# Patient Record
Sex: MALE | URBAN - NONMETROPOLITAN AREA
[De-identification: names, ages, dates, MRNs, and addresses within clinical notes are randomized per-mention and may not be internally consistent; named-entity substitution may affect disease eponyms.]

---

## 2023-10-04 ENCOUNTER — TELEPHONE (OUTPATIENT)
Dept: CARDIOLOGY CLINIC | Age: 53
End: 2023-10-04

## 2023-10-04 RX ORDER — FLECAINIDE ACETATE 100 MG/1
100 TABLET ORAL 2 TIMES DAILY
COMMUNITY

## 2023-10-04 RX ORDER — DILTIAZEM HYDROCHLORIDE 120 MG/1
120 CAPSULE, COATED, EXTENDED RELEASE ORAL DAILY
COMMUNITY

## 2023-11-22 ENCOUNTER — OFFICE VISIT (OUTPATIENT)
Dept: CARDIOLOGY CLINIC | Age: 53
End: 2023-11-22
Payer: MEDICARE

## 2023-11-22 VITALS
SYSTOLIC BLOOD PRESSURE: 144 MMHG | WEIGHT: 242 LBS | DIASTOLIC BLOOD PRESSURE: 87 MMHG | HEART RATE: 91 BPM | BODY MASS INDEX: 34.65 KG/M2 | HEIGHT: 70 IN

## 2023-11-22 DIAGNOSIS — I48.91 ATRIAL FIBRILLATION, UNSPECIFIED TYPE (HCC): Primary | ICD-10-CM

## 2023-11-22 PROCEDURE — 93000 ELECTROCARDIOGRAM COMPLETE: CPT | Performed by: INTERNAL MEDICINE

## 2023-11-22 PROCEDURE — 99204 OFFICE O/P NEW MOD 45 MIN: CPT | Performed by: INTERNAL MEDICINE

## 2023-11-22 NOTE — PATIENT INSTRUCTIONS
Our office will be calling you to schedule your procedure. Procedures are booking out into January.   Office call back # [phone] opt 1

## 2023-11-22 NOTE — PROGRESS NOTES
[place] [address] [facility] [address] [facility]  Dept: [phone]  Cardiac Electrophysiology: Consultation Note  Patient's demographics:  Date:   11/22/2023  Patient name:              Pat Wood  YOB: 1970  Sex:    male   MRN:   [id number]    Primary Care Physician:  J Carlos Bangura DO    Cardiologist:  Sam Richter MD    Reason for Consultation:  Atrial fibrillation, evaluation for catheter ablation. Clinical Summary:  Continue is a 46years old gentleman started to have intermittent palpitations 5 months ago, transient and self terminating. He remained well did offer until August 2020 23 bpm.  Milligram symptoms. He was seen by Dr. Cody Vargas and noted to be atrial fibrillation. He underwent cardioversion and had recurrences within 10 minutes. He was started on flecainide, metoprolol and Cardizem for rate control and a repeat cardioversion on 9/20/2023 that lasted for few minutes. Was referred here for evaluation of catheter ablation. Patient agreed last for few minutes but can go to an hour. Denies chest pain, shortness of breath, syncope or lower extremity swelling. No prior history of atrial arrhythmia. He is a ,  with kids. Denies smoking drinks socially. Father had MI at age 48 years. Medical Hx: Paroxysmal atrial fibrillation dx 8/2023 => DCCV 8/2023 and 9/2023 with recurrences on flecainide, metoprolol, diltiazem and rivaroxaban, hypertension and obesity. Review of systems:  Constitutional: Negative for chills and fever  HENT: Negative for congestion, sinus pressure, sneezing and sore throat. Eyes: Negative for pain, discharge, redness and itching. Respiratory: Negative for apnea, cough  Gastrointestinal: Negative for blood in stool, constipation, diarrhea   Endocrine: Negative for cold intolerance, heat intolerance, polydipsia.   Genitourinary: Negative for dysuria, enuresis, flank

## 2023-11-28 ENCOUNTER — TELEPHONE (OUTPATIENT)
Dept: CARDIOLOGY CLINIC | Age: 53
End: 2023-11-28

## 2023-11-28 DIAGNOSIS — I48.3 TYPICAL ATRIAL FLUTTER (HCC): ICD-10-CM

## 2023-11-28 DIAGNOSIS — I48.0 PAROXYSMAL ATRIAL FIBRILLATION (HCC): ICD-10-CM

## 2023-11-28 DIAGNOSIS — I48.91 ATRIAL FIBRILLATION, UNSPECIFIED TYPE (HCC): Primary | ICD-10-CM

## 2023-11-28 NOTE — TELEPHONE ENCOUNTER
Procedure: Afib. Aflutter ablation   Date:   Arrival Time:   Meds to Hold: Xarelto 1 day prior,  cardiazem 3 days prior, metoprolol 3 days prior,  flecainide 3 days prior. Please see medications -  do you agree?         Call went directly to CHELSIE

## 2023-12-01 PROBLEM — I48.3 TYPICAL ATRIAL FLUTTER (HCC): Status: ACTIVE | Noted: 2023-11-28

## 2023-12-01 PROBLEM — I48.0 PAROXYSMAL ATRIAL FIBRILLATION (HCC): Status: ACTIVE | Noted: 2023-11-28

## 2023-12-07 ENCOUNTER — TELEPHONE (OUTPATIENT)
Dept: CARDIOLOGY CLINIC | Age: 53
End: 2023-12-07

## 2023-12-07 NOTE — TELEPHONE ENCOUNTER
La Nena Barahona is requesting office notes from 11/22/23 be faxed to Dr. Teresa Maddox office [phone]

## 2024-01-03 ENCOUNTER — PATIENT MESSAGE (OUTPATIENT)
Dept: CARDIOLOGY CLINIC | Age: 54
End: 2024-01-03

## 2024-01-03 NOTE — PROGRESS NOTES
NPO after midnight  Bring drivers license and insurance information  Wear comfortable clean clothes  Shower morning of and night before with liquid antibacterial soap  Remove jewelry   Bring medications in original bottles  Made aware of visitors limit to 2 at a time  Follow all instructions given by your physician  Please notify doctor office if you need to cancel or reschedule your procedure   needed at discharge     Left message

## 2024-01-08 ENCOUNTER — PREP FOR PROCEDURE (OUTPATIENT)
Dept: CARDIOLOGY | Age: 54
End: 2024-01-08

## 2024-01-08 RX ORDER — SODIUM CHLORIDE 9 MG/ML
INJECTION, SOLUTION INTRAVENOUS PRN
Status: CANCELLED | OUTPATIENT
Start: 2024-01-08

## 2024-01-08 RX ORDER — SODIUM CHLORIDE 0.9 % (FLUSH) 0.9 %
5-40 SYRINGE (ML) INJECTION EVERY 12 HOURS SCHEDULED
Status: CANCELLED | OUTPATIENT
Start: 2024-01-08

## 2024-01-08 RX ORDER — SODIUM CHLORIDE 0.9 % (FLUSH) 0.9 %
5-40 SYRINGE (ML) INJECTION PRN
Status: CANCELLED | OUTPATIENT
Start: 2024-01-08

## 2024-01-09 ENCOUNTER — HOSPITAL ENCOUNTER (OUTPATIENT)
Age: 54
Setting detail: OUTPATIENT SURGERY
Discharge: HOME HEALTH | End: 2024-01-09
Attending: INTERNAL MEDICINE | Admitting: INTERNAL MEDICINE
Payer: COMMERCIAL

## 2024-01-09 ENCOUNTER — ANESTHESIA (OUTPATIENT)
Age: 54
End: 2024-01-09
Payer: COMMERCIAL

## 2024-01-09 ENCOUNTER — ANESTHESIA EVENT (OUTPATIENT)
Age: 54
End: 2024-01-09
Payer: COMMERCIAL

## 2024-01-09 VITALS
HEIGHT: 70 IN | SYSTOLIC BLOOD PRESSURE: 130 MMHG | BODY MASS INDEX: 34.72 KG/M2 | OXYGEN SATURATION: 97 % | HEART RATE: 81 BPM | TEMPERATURE: 97.6 F | WEIGHT: 242.51 LBS | RESPIRATION RATE: 16 BRPM | DIASTOLIC BLOOD PRESSURE: 70 MMHG

## 2024-01-09 DIAGNOSIS — I48.3 TYPICAL ATRIAL FLUTTER (HCC): ICD-10-CM

## 2024-01-09 DIAGNOSIS — I48.0 PAROXYSMAL ATRIAL FIBRILLATION (HCC): ICD-10-CM

## 2024-01-09 LAB
ABO: NORMAL
ACTIVATED CLOTTING TIME: 158 SECONDS (ref 1–150)
ACTIVATED CLOTTING TIME: 277 SECONDS (ref 1–150)
ACTIVATED CLOTTING TIME: 288 SECONDS (ref 1–150)
ACTIVATED CLOTTING TIME: 336 SECONDS (ref 1–150)
ACTIVATED CLOTTING TIME: 347 SECONDS (ref 1–150)
ACTIVATED CLOTTING TIME: 374 SECONDS (ref 1–150)
ANION GAP SERPL CALC-SCNC: 8 MEQ/L (ref 8–16)
ANTIBODY SCREEN: NORMAL
APTT PPP: 39.5 SECONDS (ref 22–38)
BUN SERPL-MCNC: 17 MG/DL (ref 7–22)
CALCIUM SERPL-MCNC: 9.3 MG/DL (ref 8.5–10.5)
CHLORIDE SERPL-SCNC: 103 MEQ/L (ref 98–111)
CO2 SERPL-SCNC: 27 MEQ/L (ref 23–33)
CREAT SERPL-MCNC: 1 MG/DL (ref 0.4–1.2)
DEPRECATED RDW RBC AUTO: 41.4 FL (ref 35–45)
ECHO BSA: 2.33 M2
ERYTHROCYTE [DISTWIDTH] IN BLOOD BY AUTOMATED COUNT: 12.7 % (ref 11.5–14.5)
GFR SERPL CREATININE-BSD FRML MDRD: > 60 ML/MIN/1.73M2
GLUCOSE SERPL-MCNC: 102 MG/DL (ref 70–108)
HCT VFR BLD AUTO: 48.7 % (ref 42–52)
HGB BLD-MCNC: 16.2 GM/DL (ref 14–18)
INR PPP: 1.15 (ref 0.85–1.13)
MCH RBC QN AUTO: 29.5 PG (ref 26–33)
MCHC RBC AUTO-ENTMCNC: 33.3 GM/DL (ref 32.2–35.5)
MCV RBC AUTO: 88.7 FL (ref 80–94)
PLATELET # BLD AUTO: 223 THOU/MM3 (ref 130–400)
PMV BLD AUTO: 10.8 FL (ref 9.4–12.4)
POTASSIUM SERPL-SCNC: 4.4 MEQ/L (ref 3.5–5.2)
RBC # BLD AUTO: 5.49 MILL/MM3 (ref 4.7–6.1)
RH FACTOR: NORMAL
SODIUM SERPL-SCNC: 138 MEQ/L (ref 135–145)
WBC # BLD AUTO: 8.1 THOU/MM3 (ref 4.8–10.8)

## 2024-01-09 PROCEDURE — C1766 INTRO/SHEATH,STRBLE,NON-PEEL: HCPCS | Performed by: INTERNAL MEDICINE

## 2024-01-09 PROCEDURE — 80048 BASIC METABOLIC PNL TOTAL CA: CPT

## 2024-01-09 PROCEDURE — 3700000000 HC ANESTHESIA ATTENDED CARE: Performed by: INTERNAL MEDICINE

## 2024-01-09 PROCEDURE — 85610 PROTHROMBIN TIME: CPT

## 2024-01-09 PROCEDURE — 2500000003 HC RX 250 WO HCPCS: Performed by: INTERNAL MEDICINE

## 2024-01-09 PROCEDURE — 2709999900 HC NON-CHARGEABLE SUPPLY: Performed by: INTERNAL MEDICINE

## 2024-01-09 PROCEDURE — 2580000003 HC RX 258: Performed by: STUDENT IN AN ORGANIZED HEALTH CARE EDUCATION/TRAINING PROGRAM

## 2024-01-09 PROCEDURE — C1730 CATH, EP, 19 OR FEW ELECT: HCPCS | Performed by: INTERNAL MEDICINE

## 2024-01-09 PROCEDURE — 6360000002 HC RX W HCPCS: Performed by: NURSE ANESTHETIST, CERTIFIED REGISTERED

## 2024-01-09 PROCEDURE — 2580000003 HC RX 258: Performed by: NURSE ANESTHETIST, CERTIFIED REGISTERED

## 2024-01-09 PROCEDURE — 93656 COMPRE EP EVAL ABLTJ ATR FIB: CPT | Performed by: INTERNAL MEDICINE

## 2024-01-09 PROCEDURE — 86850 RBC ANTIBODY SCREEN: CPT

## 2024-01-09 PROCEDURE — 85730 THROMBOPLASTIN TIME PARTIAL: CPT

## 2024-01-09 PROCEDURE — 93653 COMPRE EP EVAL TX SVT: CPT | Performed by: INTERNAL MEDICINE

## 2024-01-09 PROCEDURE — 92960 CARDIOVERSION ELECTRIC EXT: CPT | Performed by: INTERNAL MEDICINE

## 2024-01-09 PROCEDURE — 2500000003 HC RX 250 WO HCPCS: Performed by: NURSE ANESTHETIST, CERTIFIED REGISTERED

## 2024-01-09 PROCEDURE — 36415 COLL VENOUS BLD VENIPUNCTURE: CPT

## 2024-01-09 PROCEDURE — 85027 COMPLETE CBC AUTOMATED: CPT

## 2024-01-09 PROCEDURE — 6360000002 HC RX W HCPCS

## 2024-01-09 PROCEDURE — 86901 BLOOD TYPING SEROLOGIC RH(D): CPT

## 2024-01-09 PROCEDURE — C1760 CLOSURE DEV, VASC: HCPCS | Performed by: INTERNAL MEDICINE

## 2024-01-09 PROCEDURE — 93010 ELECTROCARDIOGRAM REPORT: CPT | Performed by: INTERNAL MEDICINE

## 2024-01-09 PROCEDURE — 2720000010 HC SURG SUPPLY STERILE: Performed by: INTERNAL MEDICINE

## 2024-01-09 PROCEDURE — 7100000000 HC PACU RECOVERY - FIRST 15 MIN: Performed by: INTERNAL MEDICINE

## 2024-01-09 PROCEDURE — 3700000001 HC ADD 15 MINUTES (ANESTHESIA): Performed by: INTERNAL MEDICINE

## 2024-01-09 PROCEDURE — C1893 INTRO/SHEATH, FIXED,NON-PEEL: HCPCS | Performed by: INTERNAL MEDICINE

## 2024-01-09 PROCEDURE — C1732 CATH, EP, DIAG/ABL, 3D/VECT: HCPCS | Performed by: INTERNAL MEDICINE

## 2024-01-09 PROCEDURE — 85347 COAGULATION TIME ACTIVATED: CPT

## 2024-01-09 PROCEDURE — 86900 BLOOD TYPING SEROLOGIC ABO: CPT

## 2024-01-09 PROCEDURE — 93005 ELECTROCARDIOGRAM TRACING: CPT | Performed by: STUDENT IN AN ORGANIZED HEALTH CARE EDUCATION/TRAINING PROGRAM

## 2024-01-09 PROCEDURE — 7100000001 HC PACU RECOVERY - ADDTL 15 MIN: Performed by: INTERNAL MEDICINE

## 2024-01-09 PROCEDURE — C1894 INTRO/SHEATH, NON-LASER: HCPCS | Performed by: INTERNAL MEDICINE

## 2024-01-09 PROCEDURE — C1759 CATH, INTRA ECHOCARDIOGRAPHY: HCPCS | Performed by: INTERNAL MEDICINE

## 2024-01-09 RX ORDER — ROCURONIUM BROMIDE 10 MG/ML
INJECTION, SOLUTION INTRAVENOUS PRN
Status: DISCONTINUED | OUTPATIENT
Start: 2024-01-09 | End: 2024-01-09 | Stop reason: SDUPTHER

## 2024-01-09 RX ORDER — SUCCINYLCHOLINE CHLORIDE 20 MG/ML
INJECTION INTRAMUSCULAR; INTRAVENOUS PRN
Status: DISCONTINUED | OUTPATIENT
Start: 2024-01-09 | End: 2024-01-09 | Stop reason: SDUPTHER

## 2024-01-09 RX ORDER — FUROSEMIDE 10 MG/ML
INJECTION INTRAMUSCULAR; INTRAVENOUS PRN
Status: DISCONTINUED | OUTPATIENT
Start: 2024-01-09 | End: 2024-01-09 | Stop reason: SDUPTHER

## 2024-01-09 RX ORDER — PANTOPRAZOLE SODIUM 40 MG/1
40 TABLET, DELAYED RELEASE ORAL
Qty: 30 TABLET | Refills: 0 | Status: SHIPPED | OUTPATIENT
Start: 2024-01-09

## 2024-01-09 RX ORDER — PROTAMINE SULFATE 10 MG/ML
INJECTION, SOLUTION INTRAVENOUS PRN
Status: DISCONTINUED | OUTPATIENT
Start: 2024-01-09 | End: 2024-01-09 | Stop reason: SDUPTHER

## 2024-01-09 RX ORDER — LIDOCAINE HYDROCHLORIDE 20 MG/ML
INJECTION, SOLUTION INTRAVENOUS PRN
Status: DISCONTINUED | OUTPATIENT
Start: 2024-01-09 | End: 2024-01-09 | Stop reason: SDUPTHER

## 2024-01-09 RX ORDER — PHENYLEPHRINE HCL IN 0.9% NACL 1 MG/10 ML
SYRINGE (ML) INTRAVENOUS PRN
Status: DISCONTINUED | OUTPATIENT
Start: 2024-01-09 | End: 2024-01-09 | Stop reason: SDUPTHER

## 2024-01-09 RX ORDER — MIDAZOLAM HYDROCHLORIDE 1 MG/ML
INJECTION INTRAMUSCULAR; INTRAVENOUS PRN
Status: DISCONTINUED | OUTPATIENT
Start: 2024-01-09 | End: 2024-01-09 | Stop reason: SDUPTHER

## 2024-01-09 RX ORDER — SODIUM CHLORIDE 9 MG/ML
INJECTION, SOLUTION INTRAVENOUS PRN
Status: DISCONTINUED | OUTPATIENT
Start: 2024-01-09 | End: 2024-01-09 | Stop reason: HOSPADM

## 2024-01-09 RX ORDER — PROPOFOL 10 MG/ML
INJECTION, EMULSION INTRAVENOUS PRN
Status: DISCONTINUED | OUTPATIENT
Start: 2024-01-09 | End: 2024-01-09 | Stop reason: SDUPTHER

## 2024-01-09 RX ORDER — FENTANYL CITRATE 50 UG/ML
INJECTION, SOLUTION INTRAMUSCULAR; INTRAVENOUS PRN
Status: DISCONTINUED | OUTPATIENT
Start: 2024-01-09 | End: 2024-01-09 | Stop reason: SDUPTHER

## 2024-01-09 RX ORDER — SODIUM CHLORIDE 9 MG/ML
INJECTION, SOLUTION INTRAVENOUS CONTINUOUS PRN
Status: DISCONTINUED | OUTPATIENT
Start: 2024-01-09 | End: 2024-01-09 | Stop reason: SDUPTHER

## 2024-01-09 RX ORDER — VASOPRESSIN 20 U/ML
INJECTION PARENTERAL PRN
Status: DISCONTINUED | OUTPATIENT
Start: 2024-01-09 | End: 2024-01-09 | Stop reason: SDUPTHER

## 2024-01-09 RX ORDER — DROPERIDOL 2.5 MG/ML
0.62 INJECTION, SOLUTION INTRAMUSCULAR; INTRAVENOUS ONCE
Status: COMPLETED | OUTPATIENT
Start: 2024-01-09 | End: 2024-01-09

## 2024-01-09 RX ORDER — EPHEDRINE SULFATE/0.9% NACL/PF 50 MG/5 ML
SYRINGE (ML) INTRAVENOUS PRN
Status: DISCONTINUED | OUTPATIENT
Start: 2024-01-09 | End: 2024-01-09 | Stop reason: SDUPTHER

## 2024-01-09 RX ORDER — DROPERIDOL 2.5 MG/ML
INJECTION, SOLUTION INTRAMUSCULAR; INTRAVENOUS
Status: COMPLETED
Start: 2024-01-09 | End: 2024-01-09

## 2024-01-09 RX ORDER — SODIUM CHLORIDE 0.9 % (FLUSH) 0.9 %
5-40 SYRINGE (ML) INJECTION EVERY 12 HOURS SCHEDULED
Status: DISCONTINUED | OUTPATIENT
Start: 2024-01-09 | End: 2024-01-09 | Stop reason: HOSPADM

## 2024-01-09 RX ORDER — SODIUM CHLORIDE 0.9 % (FLUSH) 0.9 %
5-40 SYRINGE (ML) INJECTION PRN
Status: DISCONTINUED | OUTPATIENT
Start: 2024-01-09 | End: 2024-01-09 | Stop reason: HOSPADM

## 2024-01-09 RX ORDER — HEPARIN SODIUM 1000 [USP'U]/ML
INJECTION, SOLUTION INTRAVENOUS; SUBCUTANEOUS PRN
Status: DISCONTINUED | OUTPATIENT
Start: 2024-01-09 | End: 2024-01-09 | Stop reason: SDUPTHER

## 2024-01-09 RX ADMIN — Medication 100 MCG: at 08:21

## 2024-01-09 RX ADMIN — FUROSEMIDE 20 MG: 10 INJECTION, SOLUTION INTRAMUSCULAR; INTRAVENOUS at 10:39

## 2024-01-09 RX ADMIN — HEPARIN SODIUM 5000 UNITS: 1000 INJECTION INTRAVENOUS; SUBCUTANEOUS at 08:15

## 2024-01-09 RX ADMIN — SODIUM CHLORIDE: 9 INJECTION, SOLUTION INTRAVENOUS at 07:19

## 2024-01-09 RX ADMIN — Medication 200 MCG: at 08:10

## 2024-01-09 RX ADMIN — SODIUM CHLORIDE: 9 INJECTION, SOLUTION INTRAVENOUS at 06:05

## 2024-01-09 RX ADMIN — Medication 100 MCG: at 07:49

## 2024-01-09 RX ADMIN — HEPARIN SODIUM 2000 UNITS: 1000 INJECTION INTRAVENOUS; SUBCUTANEOUS at 09:39

## 2024-01-09 RX ADMIN — PROPOFOL 200 MG: 10 INJECTION, EMULSION INTRAVENOUS at 07:32

## 2024-01-09 RX ADMIN — Medication 140 MG: at 07:32

## 2024-01-09 RX ADMIN — HEPARIN SODIUM 4000 UNITS: 1000 INJECTION INTRAVENOUS; SUBCUTANEOUS at 10:07

## 2024-01-09 RX ADMIN — DROPERIDOL 0.62 MG: 2.5 INJECTION, SOLUTION INTRAMUSCULAR; INTRAVENOUS at 11:37

## 2024-01-09 RX ADMIN — Medication 100 MCG: at 07:55

## 2024-01-09 RX ADMIN — Medication 100 MCG: at 08:36

## 2024-01-09 RX ADMIN — FENTANYL CITRATE 25 MCG: 50 INJECTION, SOLUTION INTRAMUSCULAR; INTRAVENOUS at 08:45

## 2024-01-09 RX ADMIN — LIDOCAINE HYDROCHLORIDE 100 MG: 20 INJECTION, SOLUTION INTRAVENOUS at 07:32

## 2024-01-09 RX ADMIN — HEPARIN SODIUM 10000 UNITS: 1000 INJECTION INTRAVENOUS; SUBCUTANEOUS at 08:31

## 2024-01-09 RX ADMIN — VASOPRESSIN 2 UNITS: 20 INJECTION INTRAVENOUS at 09:21

## 2024-01-09 RX ADMIN — Medication 100 MCG: at 07:39

## 2024-01-09 RX ADMIN — ROCURONIUM BROMIDE 5 MG: 10 INJECTION INTRAVENOUS at 07:32

## 2024-01-09 RX ADMIN — HEPARIN SODIUM 5000 UNITS: 1000 INJECTION INTRAVENOUS; SUBCUTANEOUS at 09:21

## 2024-01-09 RX ADMIN — FENTANYL CITRATE 25 MCG: 50 INJECTION, SOLUTION INTRAMUSCULAR; INTRAVENOUS at 08:21

## 2024-01-09 RX ADMIN — HEPARIN SODIUM 5000 UNITS: 1000 INJECTION INTRAVENOUS; SUBCUTANEOUS at 08:50

## 2024-01-09 RX ADMIN — VASOPRESSIN 2 UNITS: 20 INJECTION INTRAVENOUS at 10:28

## 2024-01-09 RX ADMIN — FENTANYL CITRATE 25 MCG: 50 INJECTION, SOLUTION INTRAMUSCULAR; INTRAVENOUS at 10:03

## 2024-01-09 RX ADMIN — Medication 5 MG: at 08:10

## 2024-01-09 RX ADMIN — VASOPRESSIN 2 UNITS: 20 INJECTION INTRAVENOUS at 08:34

## 2024-01-09 RX ADMIN — FENTANYL CITRATE 25 MCG: 50 INJECTION, SOLUTION INTRAMUSCULAR; INTRAVENOUS at 08:47

## 2024-01-09 RX ADMIN — PROTAMINE SULFATE 100 MG: 10 INJECTION, SOLUTION INTRAVENOUS at 10:40

## 2024-01-09 RX ADMIN — MIDAZOLAM 2 MG: 1 INJECTION INTRAMUSCULAR; INTRAVENOUS at 07:24

## 2024-01-09 ASSESSMENT — PAIN - FUNCTIONAL ASSESSMENT: PAIN_FUNCTIONAL_ASSESSMENT: NONE - DENIES PAIN

## 2024-01-09 NOTE — PROCEDURES
[facility]  HEART CENTER (EP)  [address] [facility] [address]  Dept: [phone]  CARDIAC ELECTROPHYSIOLOGY: PROCEDURE NOTE  Patients Demographics:  Date:   1/9/2024  Patient name:              Teodoro Prabhakar  YOB: 1970  Sex:    male   MRN:   [id number]    Cardiac Electrophysiologist:   Apolonia Mejia MD, MRCP, FACC, FHRS    Primary Care Provider:     Azeb Wilson DO     Cardiologist:  Ehsan Palafox MD    Procedure Planned:  Atrial fibrillation catheter ablation.     Indication/s for the Procedure:  Drug refractory symptomatic persistent atrial fibrillation and typical flutter.      Brief Clinical Summary:  Terrence is a 53 years old male with symptomatic persistent atrial fibrillation and typical atrial flutter, failed flecainide electively presents for catheter ablation.  Preserved LVEF.  Held apixaban last night.Medical Hx: Persistent atrial fibrillation dx 8/2023 => DCCV 8/2023 and 9/2023 with recurrences and atrial flutter  on flecainide, metoprolol, diltiazem and rivaroxaban, hypertension and obesity.     Procedure/s Performed:  Electro anatomical mapping (CARTO).  Intracardiac echocardiography (ICE).  Right and left cardiac catheterization with ICE guided transseptal puncture.  Left and right pulmonary vein isolation in pairs.  Cavo-tricuspid isthmus ablation.    Electric cardioversion for atrial fibrillation.    Post operative diagnosis:  Atrial flibrillation.  Typical atrial flutter.   Normal left atrial voltage.      Description of the Procedure:  The patient was brought to electrophysiology laboratory in a fasting and non-sedated state.  General anesthesia was administered by anesthesia Service. He was prepped and draped in the usual sterile fashion. Lidocaine, 2% was injected into femoral regions and right side of the neck for local anesthesia. Vascular access was obtained under ultrasound guidance and hemostatic short sheaths placed over the

## 2024-01-09 NOTE — ANESTHESIA PRE PROCEDURE
Department of Anesthesiology  Preprocedure Note       Name:  Teodoro Prabhakar   Age:  53 y.o.  :  1970                                          MRN:  [id number]         Date:  2024      Surgeon: Surgeon(s):  Apolonia Mejia MD    Procedure: Procedure(s):  Ablation A-fib w complete ep study  Ablation following A-fib addl    Medications prior to admission:   Prior to Admission medications    Medication Sig Start Date End Date Taking? Authorizing Provider   dilTIAZem (CARDIZEM CD) 120 MG extended release capsule Take 1 capsule by mouth daily    ProviderDilia MD   metoprolol tartrate (LOPRESSOR) 25 MG tablet Take 1 tablet by mouth 2 times daily    Dilia Turk MD   flecainide (TAMBOCOR) 100 MG tablet Take 1 tablet by mouth 2 times daily    Dilia Turk MD   rivaroxaban (XARELTO) 20 MG TABS tablet Take 1 tablet by mouth Daily with supper    ProviderDilia MD       Current medications:    Current Facility-Administered Medications   Medication Dose Route Frequency Provider Last Rate Last Admin    sodium chloride flush 0.9 % injection 5-40 mL  5-40 mL IntraVENous 2 times per day Mortimer, Connor, PA-C        sodium chloride flush 0.9 % injection 5-40 mL  5-40 mL IntraVENous PRN Mortimer, Connor, PA-C        0.9 % sodium chloride infusion   IntraVENous PRN Mortimer, Connor, PA-C 75 mL/hr at 24 0605 New Bag at 24 0605       Allergies:    Allergies   Allergen Reactions    Tetracyclines & Related        Problem List:    Patient Active Problem List   Diagnosis Code    Typical atrial flutter (HCC) I48.3    Paroxysmal atrial fibrillation (HCC) I48.0       Past Medical History:        Diagnosis Date    Atrial fibrillation (HCC)     new onset-      Hypertension        Past Surgical History:        Procedure Laterality Date    ANKLE SURGERY Left     CARDIOVERSION  2023    THUMB ARTHROSCOPY      TONSILLECTOMY         Social History:    Social History     Tobacco Use

## 2024-01-09 NOTE — BRIEF OP NOTE
Brief Postoperative Note    Date:   1/9/2024  Patient name: Teodoro Prabhakar  YOB: 1970  Sex: male   MRN:   [id number]    PCP: Azeb Wilson DO     Procedure:AF and AFL ablation.     Pre-Op Diagnosis: AF and AFL.     Post-Op Diagnosis: Same    Surgeon: Apolonia Mejia MD, MRCP, FACC, FHRS    Assistant: Tristian GAINES     Anesthesia/sedation: General.     Estimated Blood Loss (mL): Minimal    Complications: None    Recommendations:  See orders in Epic.  Bed rest for 2 hours.  Watch access site/s for bleeding or swelling.  Hold pressure if bleeding or swelling.  Ambulate 2 hour after suture/sheath removal.  Remove Correia's catheter (if in place) once patient ambulates.  Stop IV fluids once patient starts eating.  Resume home medications as tonight.   Follow up in 4 weeks in EP clinic.           Electronically signed by Apolonia Mejia MD, FACC, FHRS on 1/9/2024 at 11:15 AM

## 2024-01-09 NOTE — ANESTHESIA POSTPROCEDURE EVALUATION
Assessment:  No apparent anesthetic complications    Additional Follow-Up / Treatment / Comment:  None    ONEAL SANDOVAL MD  January 9, 2024   4:08 PM    No notable events documented.

## 2024-01-09 NOTE — H&P
[facility]  HEART CENTER (EP)  [address] [address]  H&P and SEDATION/ANALGESIA     Patient demographics:  Date:   1/9/2024  Patient name: Teodoro Prabhakar  YOB: 1970  Sex: male   MRN:   [id number]    Reason for admission or planned procedure:  AF and AFL ablation.     Code Status: Full Code    Consent:The risk and benefits of Afib catheter ablation including bleeding, vascular complications, pericardial tamponade requiring emergency pericardiocentesis or emergency sternotomy and placement of pericardial drain or emergency sternotomy, phrenic nerve injury, pulmonary vein stenosis, atrio-esophageal fistula,  stroke, MI, death, potential exposure to radiation and recurrent atrial tachy-arrhythmia requiring further ablations and/or initiation of AAD therapy were discussed with the patient. He verbalized understanding of the discussion. His questions were answered. The patient wishes to proceed.      Brief clinical summary:  Af and AFL    Past Medical History:  Past Medical History:   Diagnosis Date    Atrial fibrillation (HCC)     new onset-  08.2023    Hypertension        Past Surgical History:  Past Surgical History:   Procedure Laterality Date    ANKLE SURGERY Left     CARDIOVERSION  09/22/2023    THUMB ARTHROSCOPY      TONSILLECTOMY          Allergies:   Allergies as of 12/01/2023 - Fully Reviewed 11/22/2023   Allergen Reaction Noted    Tetracyclines & related  10/04/2023     Additional information:       Medications   No current facility-administered medications for this encounter.    Current Outpatient Medications:     pantoprazole (PROTONIX) 40 MG tablet, Take 1 tablet by mouth every morning (before breakfast), Disp: 30 tablet, Rfl: 0    dilTIAZem (CARDIZEM CD) 120 MG extended release capsule, Take 1 capsule by mouth daily, Disp: , Rfl:     metoprolol tartrate (LOPRESSOR) 25 MG tablet, Take 1 tablet by mouth 2 times daily, Disp: , Rfl:     flecainide

## 2024-01-09 NOTE — DISCHARGE INSTRUCTIONS
Activity:     1.  Do your normal activities except:             No heavy lifting more than 10 pounds for 3 days.              No strenuous activity for 7 days.              No driving for 24 hours.        2. You may shower in the morning, but no tub baths for 3 days.      3.  Ask your doctor when you can return to work.     4.  Remove the bandages from the puncture sites the next day if they have not already fallen off.     5. You may cover the site with a regular bandage for another day to keep the site clean and dry.     Diet:      You may resume your previous diet.     Care of Access site (groin and neck)     1. Examine the puncture site daily until it is well-healed.      2. Some bruising is expected and will slowly disappear.      3. Minor pain/soreness is also normal.       Medications:      Follow the schedule of medicines given  by your doctor.     Things to report to your doctor     1.  Fever, swelling, bleeding.     2.  Redness at the puncture site.      3.  Warm or hot sensation at puncture sites.      4.  Purulence or drainage of fluid from the puncture site.      5.  Numbness, tingling or coldness in the affected leg.     Appointments:  Call your doctor at the following number , [phone] to set up an appointment for one month after.    Call your doctor immediately:      1. If you have a fever, drainage from your puncture site, persistent chest discomfort , or uncontrolled heart rate.     2.If you cannot contact your doctor, go to the nearest emergency room.          If bleeding or swelling occurs to neck or groin sites, apply pressure, call 911 or the doctor and maintain pressure over site.              SEDATION/ANALGESIA INFORMATION/HOME GOING ADVICE    SEDATION / ANALGESIA INFORMATION / HOME GOING ADVICE  You have received the sedation/analgesia medication during your visit     Sedation/analgesia is used during short medical procedures under controlled supervision. The medication will produce a

## 2024-01-09 NOTE — PROGRESS NOTES
1108: Pt arrives to pacu, awakens to verbal stimuli. Place on NC, respirations easy and unlabored. Pt incontinent of urine, cleaned patient up. Placed external canseco catheter. Currently denies pain. VSS  1120: Pt resting off and on with eyes closed, easily awakens to voice. Continues to deny pain  1135: Report given to Anu on 2E  1137: Pt c/o nausea, 0.625mg of droperidol administered  1145: Pt sleeping at this time, no signs of distress  1157: Pt meets criteria for discharge from pacu, transported back to  in stable condition. VSS

## 2024-01-09 NOTE — PROGRESS NOTES
0530 Patient admitted to 2E02.  Ambulatory for Afib/atrial flutter ablation.   Patient NPO. Patient accompanied by wife.   Vital signs obtained.   Assessment and data collection intiated.   Oriented to room.  Policies and procedures for 2E explained.   All questions answered with no further questions at this time.   Fall prevention and safety precautions discussed with patient.     0531 Care reviewed with patient and wife.  Patient and wife verbalize understanding of the plan of care and contribute to goal setting.     0715 To cath lab per bed, stable condition.     1200 Care taken over from cath lab right and left groin and right side of  neck dressing stable.  Patient reinstructed on bedrest, instructed to keep legs straight, not to cross legs, not to lift head off of the pillow, not to laugh hard, if coughs to guard site with hand, voices understanding, taking water without difficulty.          1400 Discharge instructions given, voices understanding.  Dressing to rt neck loose, dressing replaced with a bandaid.     1308 Up in cortes, activity tolerated well,     1415 Discharged per wheelchair, stable condition.

## 2024-01-10 ENCOUNTER — TELEPHONE (OUTPATIENT)
Dept: CARDIOLOGY CLINIC | Age: 54
End: 2024-01-10

## 2024-01-10 NOTE — TELEPHONE ENCOUNTER
Pt called the office back and he said he feels great. No drainage, redness or warmth to bilat groins or rt side of neck     He did resume his tambacor, metoprolol and xarelto     Told him to call this office if he has any issues

## 2024-01-12 LAB
EKG Q-T INTERVAL: 380 MS
EKG QRS DURATION: 82 MS
EKG QTC CALCULATION (BAZETT): 452 MS
EKG R AXIS: 14 DEGREES
EKG T AXIS: 19 DEGREES
EKG VENTRICULAR RATE: 85 BPM

## 2024-01-16 ENCOUNTER — NURSE ONLY (OUTPATIENT)
Dept: CARDIOLOGY CLINIC | Age: 54
End: 2024-01-16

## 2024-01-16 NOTE — PROGRESS NOTES
Pt here post Afib ablation ( 1/9/24) for incision check.     Bilateral groin, and right jugular sites free of hematoma, hard knots, redness, or drainage. No bruising, numbness or tingling noted.     He is feeling good. No concerns. Confirmed he was taking his xarelto.     Aware to call the office in anything changes.

## 2024-02-06 NOTE — PATIENT INSTRUCTIONS
You may receive a survey regarding the care you received during your visit.  Your input is valuable to us.  We encourage you to complete and return your survey.  We hope you will choose us in the future for your healthcare needs.    Thank you for choosing [facility]!    Your Medical Assistant Today:  Ksenia PERALES      Your RN Today:  Ritu PERALES  Your Provider for Today: Dr. Mejia  Ph. [phone]           [facility] [place] [facility]   [phone]    PATIENT INSTRUCTIONS FOR A  CARDIOVERSION    You are scheduled for a Cardioversion procedure on  02.08.2024 at [facility].      PLEASE ARRIVE AT 9:00 a.m. AT THE [facility] [place].    Enter through the main hospital entrance, turn LEFT and go to the K elevators. Take the K elevator up to the second floor and step off straight ahead.  Report directly to the [address] [place] [address] [facility] [place] .      PREP :      You should not eat or drink anything after midnight.  Empty your bladder.  Remove all jewelry - rings may be taped on if necessary.  If you have a prosthetic valve, please notify the staff IF you are allergic   to any antibiotics.  Please arrange to have someone drive you home, as you will not be   able to drive for 24 hours after the procedure.    THE PROCEDURE :  Upon arrival, you will be asked to read over and sign a consent form agreeing to this procedure.  An IV will be started in your right arm.  You will receive sedation and any other necessary medication through this.  The staff will obtain a brief medical history from you.   If you have any allergies, please inform them.  You will have your blood pressure, pulse, temperature, and respirations checked also.  You will be given a patient gown and asked to remove your clothing.  Dentures, glasses, contacts, and jewelry will also be removed.    The procedure will last about 30 minutes, but you will be here at the hospital for about 2 ½ hours.  It may be longer if you have a

## 2024-02-07 ENCOUNTER — OFFICE VISIT (OUTPATIENT)
Dept: CARDIOLOGY CLINIC | Age: 54
End: 2024-02-07
Payer: COMMERCIAL

## 2024-02-07 VITALS
HEIGHT: 70 IN | SYSTOLIC BLOOD PRESSURE: 125 MMHG | OXYGEN SATURATION: 98 % | WEIGHT: 245 LBS | HEART RATE: 91 BPM | DIASTOLIC BLOOD PRESSURE: 90 MMHG | BODY MASS INDEX: 35.07 KG/M2

## 2024-02-07 DIAGNOSIS — Z86.79 STATUS POST ABLATION OF ATRIAL FIBRILLATION: Primary | ICD-10-CM

## 2024-02-07 DIAGNOSIS — Z98.890 STATUS POST ABLATION OF ATRIAL FLUTTER: ICD-10-CM

## 2024-02-07 DIAGNOSIS — Z86.79 STATUS POST ABLATION OF ATRIAL FLUTTER: ICD-10-CM

## 2024-02-07 DIAGNOSIS — Z98.890 STATUS POST ABLATION OF ATRIAL FIBRILLATION: Primary | ICD-10-CM

## 2024-02-07 DIAGNOSIS — Z86.79 S/P ABLATION OF ATRIAL FLUTTER: ICD-10-CM

## 2024-02-07 DIAGNOSIS — Z98.890 S/P ABLATION OF ATRIAL FLUTTER: ICD-10-CM

## 2024-02-07 PROBLEM — I48.91 A-FIB (HCC): Status: ACTIVE | Noted: 2024-02-07

## 2024-02-07 PROCEDURE — 99214 OFFICE O/P EST MOD 30 MIN: CPT | Performed by: INTERNAL MEDICINE

## 2024-02-07 PROCEDURE — 93000 ELECTROCARDIOGRAM COMPLETE: CPT | Performed by: INTERNAL MEDICINE

## 2024-02-07 NOTE — PROGRESS NOTES
carotid bruits.  HEART: Normal S1/S2. No murmur, rub or gallop.  LUNGS: Clear to auscultation.  ABDOMEN: Soft and non-tender.   EXTREMITIES: No lower extremity edema.  Groins are soft and nontender.  No bruit  NEUROLOGICAL: Grossly normal.     Laboratory And Diagnostic Data  I have personally reviewed and interpreted the results of the following diagnostic testing    CBC, BMP and TSH from outside hospital reviewed: Within normal limits.      Echocardiogram 8/9/2023: LVEF 55%, LVWT 1 cm, LAD/JUDE 4.8 cm.  Mild MR.    ECG 10/4/2023: Atrial fibrillation, controlled ventricular rate.  ECG 10/22/2023: Typical atrial flutter, controlled ventricular rate.  ECG 2/7/2024: Atrial fibrillation, controlled ventricular rate.  Stress test NA      Impression:  Recurrent atrial fibrillation.   Status post atrial fibrillation and typical flutter ablation.  [id number] score 0.  On flecainide, metoprolol, Cardizem and rivaroxaban.  Hypertension, obesity and history of snoring.    Assessment And Recommendations:  Unfortunately, Lion had recurrent atrial fibrillation.  Mild symptoms. He is still within 3 months of atrial fibrillation ablation window.  Will proceed with cardioversion.  Reassess him in 2 months following cardioversion requiring discontinuation of antiarrhythmic therapy and anticoagulation.  The patient is agreeable with the plan.    Thank you for allowing me to participate in the care of your patient. Please call me if you have any questions.      **This report has been created using voice recognition software. It may contain minor errors which are inherent in voice recognition technology.**       Electronically signed by Apolonia Mejia MD, MRCP, FACC, RS on 2/7/2024 at 8:18 AM

## 2024-02-08 ENCOUNTER — HOSPITAL ENCOUNTER (OUTPATIENT)
Age: 54
Discharge: HOME | End: 2024-02-10
Attending: INTERNAL MEDICINE
Payer: COMMERCIAL

## 2024-02-08 ENCOUNTER — ANESTHESIA (OUTPATIENT)
Age: 54
End: 2024-02-08
Payer: COMMERCIAL

## 2024-02-08 ENCOUNTER — ANESTHESIA EVENT (OUTPATIENT)
Age: 54
End: 2024-02-08
Payer: COMMERCIAL

## 2024-02-08 VITALS
SYSTOLIC BLOOD PRESSURE: 109 MMHG | BODY MASS INDEX: 34.12 KG/M2 | WEIGHT: 238.32 LBS | OXYGEN SATURATION: 97 % | HEIGHT: 70 IN | RESPIRATION RATE: 16 BRPM | HEART RATE: 60 BPM | TEMPERATURE: 97.8 F | DIASTOLIC BLOOD PRESSURE: 80 MMHG

## 2024-02-08 DIAGNOSIS — Z86.79 STATUS POST ABLATION OF ATRIAL FLUTTER: ICD-10-CM

## 2024-02-08 DIAGNOSIS — Z98.890 STATUS POST ABLATION OF ATRIAL FIBRILLATION: ICD-10-CM

## 2024-02-08 DIAGNOSIS — Z86.79 STATUS POST ABLATION OF ATRIAL FIBRILLATION: ICD-10-CM

## 2024-02-08 DIAGNOSIS — Z98.890 STATUS POST ABLATION OF ATRIAL FLUTTER: ICD-10-CM

## 2024-02-08 LAB
ANION GAP SERPL CALC-SCNC: 7 MEQ/L (ref 8–16)
APTT PPP: 40.3 SECONDS (ref 22–38)
BUN SERPL-MCNC: 19 MG/DL (ref 7–22)
CALCIUM SERPL-MCNC: 9.1 MG/DL (ref 8.5–10.5)
CHLORIDE SERPL-SCNC: 104 MEQ/L (ref 98–111)
CO2 SERPL-SCNC: 27 MEQ/L (ref 23–33)
CREAT SERPL-MCNC: 1.1 MG/DL (ref 0.4–1.2)
DEPRECATED RDW RBC AUTO: 41.7 FL (ref 35–45)
ECHO BSA: 2.31 M2
EKG ATRIAL RATE: 214 BPM
EKG ATRIAL RATE: 51 BPM
EKG P AXIS: 4 DEGREES
EKG P-R INTERVAL: 254 MS
EKG Q-T INTERVAL: 372 MS
EKG Q-T INTERVAL: 478 MS
EKG QRS DURATION: 94 MS
EKG QRS DURATION: 96 MS
EKG QTC CALCULATION (BAZETT): 439 MS
EKG QTC CALCULATION (BAZETT): 440 MS
EKG R AXIS: 0 DEGREES
EKG R AXIS: 30 DEGREES
EKG T AXIS: -7 DEGREES
EKG T AXIS: 9 DEGREES
EKG VENTRICULAR RATE: 51 BPM
EKG VENTRICULAR RATE: 84 BPM
ERYTHROCYTE [DISTWIDTH] IN BLOOD BY AUTOMATED COUNT: 12.8 % (ref 11.5–14.5)
GFR SERPL CREATININE-BSD FRML MDRD: > 60 ML/MIN/1.73M2
GLUCOSE SERPL-MCNC: 122 MG/DL (ref 70–108)
HCT VFR BLD AUTO: 46.3 % (ref 42–52)
HGB BLD-MCNC: 15.2 GM/DL (ref 14–18)
INR PPP: 1.45 (ref 0.85–1.13)
MCH RBC QN AUTO: 29.1 PG (ref 26–33)
MCHC RBC AUTO-ENTMCNC: 32.8 GM/DL (ref 32.2–35.5)
MCV RBC AUTO: 88.7 FL (ref 80–94)
PLATELET # BLD AUTO: 225 THOU/MM3 (ref 130–400)
PMV BLD AUTO: 10.9 FL (ref 9.4–12.4)
POTASSIUM SERPL-SCNC: 4.6 MEQ/L (ref 3.5–5.2)
RBC # BLD AUTO: 5.22 MILL/MM3 (ref 4.7–6.1)
SODIUM SERPL-SCNC: 138 MEQ/L (ref 135–145)
WBC # BLD AUTO: 6.6 THOU/MM3 (ref 4.8–10.8)

## 2024-02-08 PROCEDURE — 2580000003 HC RX 258: Performed by: INTERNAL MEDICINE

## 2024-02-08 PROCEDURE — 80048 BASIC METABOLIC PNL TOTAL CA: CPT

## 2024-02-08 PROCEDURE — 85610 PROTHROMBIN TIME: CPT

## 2024-02-08 PROCEDURE — 99152 MOD SED SAME PHYS/QHP 5/>YRS: CPT | Performed by: INTERNAL MEDICINE

## 2024-02-08 PROCEDURE — 92960 CARDIOVERSION ELECTRIC EXT: CPT

## 2024-02-08 PROCEDURE — 7100000011 HC PHASE II RECOVERY - ADDTL 15 MIN: Performed by: INTERNAL MEDICINE

## 2024-02-08 PROCEDURE — 36415 COLL VENOUS BLD VENIPUNCTURE: CPT

## 2024-02-08 PROCEDURE — 85027 COMPLETE CBC AUTOMATED: CPT

## 2024-02-08 PROCEDURE — 7100000010 HC PHASE II RECOVERY - FIRST 15 MIN: Performed by: INTERNAL MEDICINE

## 2024-02-08 PROCEDURE — 92960 CARDIOVERSION ELECTRIC EXT: CPT | Performed by: INTERNAL MEDICINE

## 2024-02-08 PROCEDURE — 3700000000 HC ANESTHESIA ATTENDED CARE: Performed by: INTERNAL MEDICINE

## 2024-02-08 PROCEDURE — 6360000002 HC RX W HCPCS

## 2024-02-08 PROCEDURE — 93005 ELECTROCARDIOGRAM TRACING: CPT | Performed by: INTERNAL MEDICINE

## 2024-02-08 PROCEDURE — 85730 THROMBOPLASTIN TIME PARTIAL: CPT

## 2024-02-08 RX ORDER — SODIUM CHLORIDE 0.9 % (FLUSH) 0.9 %
5-40 SYRINGE (ML) INJECTION EVERY 12 HOURS SCHEDULED
Status: DISCONTINUED | OUTPATIENT
Start: 2024-02-08 | End: 2024-02-11 | Stop reason: HOSPADM

## 2024-02-08 RX ORDER — SODIUM CHLORIDE 0.9 % (FLUSH) 0.9 %
5-40 SYRINGE (ML) INJECTION PRN
Status: DISCONTINUED | OUTPATIENT
Start: 2024-02-08 | End: 2024-02-11 | Stop reason: HOSPADM

## 2024-02-08 RX ORDER — DILTIAZEM HYDROCHLORIDE 120 MG/1
120 CAPSULE, COATED, EXTENDED RELEASE ORAL DAILY
Qty: 90 CAPSULE | Refills: 0 | Status: SHIPPED | OUTPATIENT
Start: 2024-02-08

## 2024-02-08 RX ORDER — FLECAINIDE ACETATE 100 MG/1
100 TABLET ORAL 2 TIMES DAILY
Qty: 180 TABLET | Refills: 0 | Status: SHIPPED | OUTPATIENT
Start: 2024-02-08

## 2024-02-08 RX ORDER — NITROGLYCERIN 0.4 MG/1
0.4 TABLET SUBLINGUAL EVERY 5 MIN PRN
Status: DISCONTINUED | OUTPATIENT
Start: 2024-02-08 | End: 2024-02-11 | Stop reason: HOSPADM

## 2024-02-08 RX ORDER — DIPHENHYDRAMINE HYDROCHLORIDE 50 MG/ML
25 INJECTION INTRAMUSCULAR; INTRAVENOUS ONCE
Status: DISCONTINUED | OUTPATIENT
Start: 2024-02-08 | End: 2024-02-11 | Stop reason: HOSPADM

## 2024-02-08 RX ORDER — ASPIRIN 81 MG/1
324 TABLET, CHEWABLE ORAL ONCE
Status: DISCONTINUED | OUTPATIENT
Start: 2024-02-08 | End: 2024-02-11 | Stop reason: HOSPADM

## 2024-02-08 RX ORDER — SODIUM CHLORIDE 9 MG/ML
INJECTION, SOLUTION INTRAVENOUS PRN
Status: DISCONTINUED | OUTPATIENT
Start: 2024-02-08 | End: 2024-02-11 | Stop reason: HOSPADM

## 2024-02-08 RX ADMIN — PROPOFOL 60 MG: 10 INJECTION, EMULSION INTRAVENOUS at 11:17

## 2024-02-08 RX ADMIN — SODIUM CHLORIDE: 9 INJECTION, SOLUTION INTRAVENOUS at 10:26

## 2024-02-08 NOTE — H&P
[facility]  HEART CENTER (EP)  [address] [facility] [address]  H&P and SEDATION/ANALGESIA     Patient demographics:  Date:   2/8/2024  Patient name: Teodoro Prabhakar  YOB: 1970  Sex: male   MRN:   [id number]    Reason for admission or planned procedure:  DCCV    Code Status: Full Code    Consent:I have discussed with the patient and/or the patient representative the indication, alternatives, and the possible risks and/or complications of the planned procedure and the anesthesia methods. The patient and/or patient representative appear to understand and agree to proceed.      Brief clinical summary:  AF with RVR    Past Medical History:  Past Medical History:   Diagnosis Date    Atrial fibrillation (HCC)     new onset-  08.2023    Hypertension        Past Surgical History:  Past Surgical History:   Procedure Laterality Date    ANKLE SURGERY Left     CARDIOVERSION  09/22/2023    EP DEVICE PROCEDURE N/A 1/9/2024    Ablation A-fib w complete ep study performed by Apolonia Mejia MD at [facility] CARDIAC CATH LAB    EP DEVICE PROCEDURE N/A 1/9/2024    Ablation following A-fib addl performed by Apolonia Mejia MD at [facility] CARDIAC CATH LAB    THUMB ARTHROSCOPY      TONSILLECTOMY          Allergies:   Allergies as of 02/08/2024 - Fully Reviewed 02/07/2024   Allergen Reaction Noted    Tetracyclines & related  10/04/2023     Additional information:       Medications     Current Outpatient Medications:     pantoprazole (PROTONIX) 40 MG tablet, Take 1 tablet by mouth every morning (before breakfast), Disp: 30 tablet, Rfl: 0    dilTIAZem (CARDIZEM CD) 120 MG extended release capsule, Take 1 capsule by mouth daily, Disp: , Rfl:     metoprolol tartrate (LOPRESSOR) 25 MG tablet, Take 1 tablet by mouth 2 times daily, Disp: , Rfl:     flecainide (TAMBOCOR) 100 MG tablet, Take 1 tablet by mouth 2 times daily, Disp: , Rfl:     rivaroxaban (XARELTO) 20 MG TABS tablet, Take 1 tablet by

## 2024-02-08 NOTE — ANESTHESIA PRE PROCEDURE
Department of Anesthesiology  Preprocedure Note       Name:  Teodoro Prabhakar   Age:  53 y.o.  :  1970                                          MRN:  [id number]         Date:  2024      Surgeon: * No surgeons listed *    Procedure: * No procedures listed *    Medications prior to admission:   Prior to Admission medications    Medication Sig Start Date End Date Taking? Authorizing Provider   dilTIAZem (CARDIZEM CD) 120 MG extended release capsule Take 1 capsule by mouth daily    Dilia Turk MD   metoprolol tartrate (LOPRESSOR) 25 MG tablet Take 1 tablet by mouth 2 times daily    Dilia Turk MD   flecainide (TAMBOCOR) 100 MG tablet Take 1 tablet by mouth 2 times daily    Dilia Turk MD   rivaroxaban (XARELTO) 20 MG TABS tablet Take 1 tablet by mouth Daily with supper    ProviderDilia MD       Current medications:    Current Outpatient Medications   Medication Sig Dispense Refill   • dilTIAZem (CARDIZEM CD) 120 MG extended release capsule Take 1 capsule by mouth daily     • metoprolol tartrate (LOPRESSOR) 25 MG tablet Take 1 tablet by mouth 2 times daily     • flecainide (TAMBOCOR) 100 MG tablet Take 1 tablet by mouth 2 times daily     • rivaroxaban (XARELTO) 20 MG TABS tablet Take 1 tablet by mouth Daily with supper       Current Facility-Administered Medications   Medication Dose Route Frequency Provider Last Rate Last Admin   • sodium chloride flush 0.9 % injection 5-40 mL  5-40 mL IntraVENous 2 times per day Apolonia Mejia MD       • sodium chloride flush 0.9 % injection 5-40 mL  5-40 mL IntraVENous PRN Apolonia Mejia MD       • 0.9 % sodium chloride infusion   IntraVENous PRN Apolonia Mejia MD       • aspirin chewable tablet 324 mg  324 mg Oral Once Apolonia Mejia MD       • diphenhydrAMINE (BENADRYL) injection 25 mg  25 mg IntraVENous Once Apolonia Mejia MD       • hydrocortisone sodium succinate PF (SOLU-CORTEF) injection 100 mg  100 mg

## 2024-02-08 NOTE — DISCHARGE INSTRUCTIONS
Name band; following effects from the medication.  \"           Drowsiness, dizziness, sleepiness or confusion.  \"           Difficulty remembering or delayed reaction times.  \"           Loss of fine muscle control or difficulty with your balance especially while walking.  \"           Difficulty focusing or blurred vision.  You may not be aware of slight changes in your behavior and/or your reaction time because of the medication used during the procedure. Therefore you should follow these instructions.  \"           Have someone responsible help you with your care.  \"           Do not drive for 24 hours.  \"           Do not operate equipment for 24 hours (lawnmowers, power tools, kitchen accessories, stove).  \"           Do not drink any alcoholic beverages for a minimum of 24 hours.  \"           Do not make important personal, legal or business decisions for 24 hours.  \"           You may experience dizziness or lightheadedness. Move slowly and carefully, do not make sudden position changes.  \"           Drink extra amounts of fluids today.  \"           Increase your diet as tolerated (unless you have received specific instructions from your doctor).  \"           If you feel nauseated, continue with liquids until the nausea is gone.  \"           Notify your physician if you have not urinated within 8 hours after the procedure.  \"           Resume your medications unless otherwise instructed.     Contact your physician if you have any questions or concerns.     IF YOU REPORT TO AN EMERGENCY ROOM, DOCTOR'S OFFICE OR HOSPITAL WITHIN 24 HOURS AFTER YOUR PROCEDURE, BRING THIS SHEET AND YOUR AFTER VISIT SUMMARY WITH YOU AND GIVE IT TO THE PHYSICIAN OR NURSE ATTENDING YOU.

## 2024-02-08 NOTE — ANESTHESIA POSTPROCEDURE EVALUATION
Department of Anesthesiology  Postprocedure Note    Patient: Teodoro Prabhakar  MRN: [id number]  YOB: 1970  Date of evaluation: 2/8/2024    Procedure Summary       Date: 02/08/24 Room / Location: [facility] Cardiac Cath Lab    Anesthesia Start: 1115 Anesthesia Stop: 1120    Procedure: CARDIOVERSION EXTERNAL Diagnosis:       Status post ablation of atrial fibrillation      Status post ablation of atrial flutter      (afib/ aflutter)    Scheduled Providers: Apolonia Mejia MD Responsible Provider: Jono Lockwood DO    Anesthesia Type: MAC ASA Status: 2            Anesthesia Type: MAC    Nurys Phase I: Nurys Score: 8    Nurys Phase II:      Anesthesia Post Evaluation    Patient location during evaluation: bedside  Patient participation: complete - patient participated  Level of consciousness: awake and alert  Pain score: 0  Airway patency: patent  Nausea & Vomiting: no nausea and no vomiting  Cardiovascular status: blood pressure returned to baseline  Respiratory status: acceptable  Hydration status: euvolemic  Multimodal analgesia pain management approach  Pain management: adequate        No notable events documented.

## 2024-02-13 RX ORDER — DILTIAZEM HYDROCHLORIDE 120 MG/1
120 CAPSULE, COATED, EXTENDED RELEASE ORAL DAILY
Qty: 90 CAPSULE | Refills: 0 | Status: SHIPPED | OUTPATIENT
Start: 2024-02-13

## 2024-03-19 NOTE — PATIENT INSTRUCTIONS
You may receive a survey regarding the care you received during your visit.  Your input is valuable to us.  We encourage you to complete and return your survey.  We hope you will choose us in the future for your healthcare needs.    Thank you for choosing [facility]!    Your Medical Assistant Today:  Ksenia PERALES      Your RN Today:  Ritu PERALES  Your Provider for Today: Dr. Mejia  Ph. [phone]           Stop flecainide and diltiazem

## 2024-03-20 ENCOUNTER — OFFICE VISIT (OUTPATIENT)
Dept: CARDIOLOGY CLINIC | Age: 54
End: 2024-03-20
Payer: COMMERCIAL

## 2024-03-20 VITALS
HEIGHT: 70 IN | WEIGHT: 249 LBS | SYSTOLIC BLOOD PRESSURE: 123 MMHG | BODY MASS INDEX: 35.65 KG/M2 | DIASTOLIC BLOOD PRESSURE: 75 MMHG | OXYGEN SATURATION: 96 % | HEART RATE: 61 BPM

## 2024-03-20 DIAGNOSIS — Z86.79 STATUS POST ABLATION OF ATRIAL FLUTTER: ICD-10-CM

## 2024-03-20 DIAGNOSIS — Z86.79 STATUS POST ABLATION OF ATRIAL FIBRILLATION: Primary | ICD-10-CM

## 2024-03-20 DIAGNOSIS — Z98.890 STATUS POST ABLATION OF ATRIAL FIBRILLATION: Primary | ICD-10-CM

## 2024-03-20 DIAGNOSIS — Z98.890 STATUS POST ABLATION OF ATRIAL FLUTTER: ICD-10-CM

## 2024-03-20 PROCEDURE — 99214 OFFICE O/P EST MOD 30 MIN: CPT | Performed by: INTERNAL MEDICINE

## 2024-03-20 PROCEDURE — 93000 ELECTROCARDIOGRAM COMPLETE: CPT | Performed by: INTERNAL MEDICINE

## 2024-03-20 NOTE — PROGRESS NOTES
08.2023    Hypertension        Past Surgical History:  Past Surgical History:   Procedure Laterality Date    ANKLE SURGERY Left     CARDIOVERSION  09/22/2023    EP DEVICE PROCEDURE N/A 1/9/2024    Ablation A-fib w complete ep study performed by Apolonia Mejia MD at [facility] CARDIAC CATH LAB    EP DEVICE PROCEDURE N/A 1/9/2024    Ablation following A-fib addl performed by Apolonia Mejia MD at [facility] CARDIAC CATH LAB    THUMB ARTHROSCOPY      TONSILLECTOMY         Family History:  Family History   Problem Relation Age of Onset    Other Father         MI    Cancer Father         Social History:  Social History     Socioeconomic History    Marital status:      Spouse name: Marisol    Number of children: 5    Years of education: None    Highest education level: None   Occupational History    Occupation:    Tobacco Use    Smoking status: Never    Smokeless tobacco: Never   Vaping Use    Vaping Use: Never used   Substance and Sexual Activity    Alcohol use: Yes     Comment: 1-2 beers per week    Drug use: Never    Sexual activity: Yes     Partners: Female        Allergies:  Allergies   Allergen Reactions    Tetracyclines & Related         Medications:  Current Outpatient Medications   Medication Sig Dispense Refill    dilTIAZem (CARDIZEM CD) 120 MG extended release capsule Take 1 capsule by mouth daily 90 capsule 0    metoprolol tartrate (LOPRESSOR) 25 MG tablet Take 1 tablet by mouth 2 times daily 180 tablet 0    flecainide (TAMBOCOR) 100 MG tablet Take 1 tablet by mouth 2 times daily 180 tablet 0    rivaroxaban (XARELTO) 20 MG TABS tablet Take 1 tablet by mouth Daily with supper       No current facility-administered medications for this visit.       Physical Examination:  Vitals:    03/20/24 0752   BP: 123/75   Pulse: 61   SpO2: 96%          Body mass index is 35.73 kg/m².   GENERAL: Alert and oriented. No distress.  EYES: No pallor or icterus.  ENT: No cyanosis.  VESSELS: No jugular venous

## 2025-03-13 NOTE — PROGRESS NOTES
[facility] [place] [facility]  [facility] CARDIOLOGY  [address]  [address]  [place] [address]  Dept: [phone]  Dept Fax: [phone]  Loc: [phone]    Visit Date: 3/19/2025    Teodoro Prabhakar is a 54 y.o. male who presents todayfor:  Chief Complaint   Patient presents with    1 Year Follow Up     Cardiologist: [facility]    Hx of PAF/flutter s/p ablation, HTN, obesity    HPI: 1 year f/u  No chest pain, angina, DEAL, orthopnea, PND, sob at rest, palpitations, LE edema, or syncope.  Feeling well. He has been doing well with medications but asking about stopping them. Follows [facility] still but wants to stay with us also.     Past Surgical History:   Procedure Laterality Date    ANKLE SURGERY Left     CARDIOVERSION  09/22/2023    EP DEVICE PROCEDURE N/A 1/9/2024    Ablation A-fib w complete ep study performed by Apolonia Mejia MD at [facility] CARDIAC CATH LAB    EP DEVICE PROCEDURE N/A 1/9/2024    Ablation following A-fib addl performed by Apolonia Mejia MD at [facility] CARDIAC CATH LAB    THUMB ARTHROSCOPY      TONSILLECTOMY       Family History   Problem Relation Age of Onset    Other Father         MI    Cancer Father      Social History     Tobacco Use    Smoking status: Never    Smokeless tobacco: Never   Substance Use Topics    Alcohol use: Yes     Alcohol/week: 4.0 standard drinks of alcohol     Types: 4 Cans of beer per week     Comment: 1-2 beers per week      Current Outpatient Medications   Medication Sig Dispense Refill    metoprolol tartrate (LOPRESSOR) 25 MG tablet Take 1 tablet by mouth 2 times daily 180 tablet 0    rivaroxaban (XARELTO) 20 MG TABS tablet Take 1 tablet by mouth Daily with supper       No current facility-administered medications for this visit.     Allergies   Allergen Reactions    Tetracyclines & Related      Health Maintenance   Topic Date Due    Polio vaccine (4 of 4 - 4-dose series) 04/04/1973    Depression Screen  Never done    DTaP/Tdap/Td vaccine (6 -

## 2025-03-19 ENCOUNTER — OFFICE VISIT (OUTPATIENT)
Dept: CARDIOLOGY CLINIC | Age: 55
End: 2025-03-19
Payer: COMMERCIAL

## 2025-03-19 VITALS
WEIGHT: 245 LBS | SYSTOLIC BLOOD PRESSURE: 147 MMHG | HEIGHT: 70 IN | DIASTOLIC BLOOD PRESSURE: 87 MMHG | HEART RATE: 74 BPM | BODY MASS INDEX: 35.07 KG/M2

## 2025-03-19 DIAGNOSIS — I48.0 PAROXYSMAL ATRIAL FIBRILLATION (HCC): Primary | ICD-10-CM

## 2025-03-19 DIAGNOSIS — Z98.890 STATUS POST ABLATION OF ATRIAL FIBRILLATION: ICD-10-CM

## 2025-03-19 DIAGNOSIS — Z86.79 STATUS POST ABLATION OF ATRIAL FIBRILLATION: ICD-10-CM

## 2025-03-19 DIAGNOSIS — Z86.79 S/P ABLATION OF ATRIAL FLUTTER: ICD-10-CM

## 2025-03-19 DIAGNOSIS — Z98.890 S/P ABLATION OF ATRIAL FLUTTER: ICD-10-CM

## 2025-03-19 DIAGNOSIS — I48.3 TYPICAL ATRIAL FLUTTER (HCC): ICD-10-CM

## 2025-03-19 PROCEDURE — 1036F TOBACCO NON-USER: CPT | Performed by: STUDENT IN AN ORGANIZED HEALTH CARE EDUCATION/TRAINING PROGRAM

## 2025-03-19 PROCEDURE — 3017F COLORECTAL CA SCREEN DOC REV: CPT | Performed by: STUDENT IN AN ORGANIZED HEALTH CARE EDUCATION/TRAINING PROGRAM

## 2025-03-19 PROCEDURE — 99214 OFFICE O/P EST MOD 30 MIN: CPT | Performed by: STUDENT IN AN ORGANIZED HEALTH CARE EDUCATION/TRAINING PROGRAM

## 2025-03-19 PROCEDURE — G8427 DOCREV CUR MEDS BY ELIG CLIN: HCPCS | Performed by: STUDENT IN AN ORGANIZED HEALTH CARE EDUCATION/TRAINING PROGRAM

## 2025-03-19 PROCEDURE — G8417 CALC BMI ABV UP PARAM F/U: HCPCS | Performed by: STUDENT IN AN ORGANIZED HEALTH CARE EDUCATION/TRAINING PROGRAM

## 2025-03-19 PROCEDURE — 93000 ELECTROCARDIOGRAM COMPLETE: CPT | Performed by: STUDENT IN AN ORGANIZED HEALTH CARE EDUCATION/TRAINING PROGRAM

## 2025-03-19 NOTE — PATIENT INSTRUCTIONS
You may receive a survey regarding the care you received during your visit. We encourage you to complete and return your survey, as your input is valuable to us. We hope you will choose us in the future for your healthcare needs. Thank you!    Your Nurse/Medical Assistant today: Roya Goldstein  Thank you for coming to our office! It was a pleasure to serve you.

## 2025-08-01 ENCOUNTER — COMMUNITY OUTREACH (OUTPATIENT)
Dept: PRIMARY CARE CLINIC | Age: 55
End: 2025-08-01

## (undated) DEVICE — CATHETER MAP D CRV 2-2-2-2-2 MM SPC PERSEID OCTARAY

## (undated) DEVICE — SYSTEM CLOSURE 6-12 FR VEN VASC VASCADE MVP

## (undated) DEVICE — PROBE TEMP ESOPH MULT LEVEL SENSOR SENSITHERM

## (undated) DEVICE — CATHETER US GE COMPATIBILITY SOUNDSTAR ECO 10FR

## (undated) DEVICE — 4F (1.0MM ID) X 9CM STIFF MICRO-STICK® INTRODUCER SET WITH NITINOL GUIDEWIRE WITH RADIOPAQUE TIP: Brand: MICRO-STICK SETMICRO-STICK SET

## (undated) DEVICE — PINNACLE INTRODUCER SHEATH: Brand: PINNACLE

## (undated) DEVICE — EP RECORDING CATHETER 14 POLE, FIXED CURVE: Brand: EP RECORDING CATHETER

## (undated) DEVICE — SHEATH GUID 11.5X8.5FR L71MM M CRV L22MM BIDIR STEER CARTO

## (undated) DEVICE — CATHETER ABLAT 8FR L115CM 1-6-2MM SPC TIP 3.5MM DF CRV

## (undated) DEVICE — 1 X VERSACROSS TRANSSEPTAL SHEATH; 1 X VERSACROSS TRANSSEPTAL DILATOR; 1 X J-TIP GUIDEWIRE: Brand: VERSACROSS TRANSSEPTAL SHEATH

## (undated) DEVICE — TUBING PMP FOR CARTO SYS SMARTABLATE

## (undated) DEVICE — PATCH REF EXT FOR CARTO 3 SYS (EA = 6 PACKS)